# Patient Record
Sex: MALE | Race: WHITE
[De-identification: names, ages, dates, MRNs, and addresses within clinical notes are randomized per-mention and may not be internally consistent; named-entity substitution may affect disease eponyms.]

---

## 2017-01-20 ENCOUNTER — HOSPITAL ENCOUNTER (EMERGENCY)
Dept: HOSPITAL 17 - PHEFT | Age: 22
Discharge: HOME | End: 2017-01-20
Payer: MEDICAID

## 2017-01-20 VITALS
HEART RATE: 91 BPM | OXYGEN SATURATION: 97 % | TEMPERATURE: 97.9 F | RESPIRATION RATE: 16 BRPM | DIASTOLIC BLOOD PRESSURE: 76 MMHG | SYSTOLIC BLOOD PRESSURE: 128 MMHG

## 2017-01-20 VITALS — BODY MASS INDEX: 26.85 KG/M2 | WEIGHT: 191.8 LBS | HEIGHT: 71 IN

## 2017-01-20 DIAGNOSIS — Z20.2: ICD-10-CM

## 2017-01-20 DIAGNOSIS — F17.210: ICD-10-CM

## 2017-01-20 DIAGNOSIS — R30.0: Primary | ICD-10-CM

## 2017-01-20 LAB
CHLAMYDIA PCR: DETECTED
COLOR UR: YELLOW
COMMENT (UR): (no result)
CULTURE IF INDICATED: (no result)
GLUCOSE UR STRIP-MCNC: (no result) MG/DL
HGB UR QL STRIP: (no result)
KETONES UR STRIP-MCNC: (no result) MG/DL
LABORATORY COMMENT REPORT: (no result)
METHOD OF COLLECTION: (no result)
NEISSERIA PCR: NOT DETECTED
NITRITE UR QL STRIP: (no result)
SP GR UR STRIP: 1.02 (ref 1–1.03)
SQUAMOUS #/AREA URNS HPF: (no result) /HPF (ref 0–5)

## 2017-01-20 PROCEDURE — 96372 THER/PROPH/DIAG INJ SC/IM: CPT

## 2017-01-20 PROCEDURE — 81001 URINALYSIS AUTO W/SCOPE: CPT

## 2017-01-20 PROCEDURE — 99283 EMERGENCY DEPT VISIT LOW MDM: CPT

## 2017-01-20 PROCEDURE — 87086 URINE CULTURE/COLONY COUNT: CPT

## 2017-01-20 PROCEDURE — 87591 N.GONORRHOEAE DNA AMP PROB: CPT

## 2017-01-20 PROCEDURE — 87491 CHLMYD TRACH DNA AMP PROBE: CPT

## 2017-01-20 NOTE — PD
HPI


Chief Complaint:   Complaint


Time Seen by Provider:  15:15


Travel History


International Travel<30 days:  No


Contact w/Intl Traveler<30days:  No


Traveled to known affect area:  No





History of Present Illness


HPI


Patient's a 21-year-old male who comes into the emergency department for 

evaluation for exposure to chlamydia.  Patient states his girlfriend was 

diagnosed with Chlamydia one week ago during a routine prenatal exam.  Patient 

denies any discharge but reports occasional burning with urination.  He further 

denies any fevers, chills, abdominal pain or nausea, vomiting, back pain.





PFSH


Past Medical History


Medical History:  Denies Significant Hx


Tetanus Vaccination:  > 5 Years


Influenza Vaccination:  No





Social History


Alcohol Use:  No


Tobacco Use:  Yes (1 PPD)


Substance Use:  No





Allergies-Medications


(Allergen,Severity, Reaction):  


Coded Allergies:  


     No Known Allergies (Unverified , 1/20/17)


Reported Meds & Prescriptions





Reported Meds & Active Scripts


Active


No Active Prescriptions or Reported Medications    








Review of Systems


Except as stated in HPI:  all other systems reviewed are Neg


Genitourinary:  Positive: Dysuria





Physical Exam


Narrative


GENERAL: Well-nourished, well-developed patient.


SKIN: Warm and dry.


HEAD: Normocephalic.


EYES: No scleral icterus. No injection or drainage. 


NECK: Supple, trachea midline. No JVD or lymphadenopathy.


CARDIOVASCULAR: Regular rate and rhythm without murmurs, gallops, or rubs. 


RESPIRATORY: Breath sounds equal bilaterally. No accessory muscle use.


GASTROINTESTINAL: Abdomen soft, non-tender, nondistended. 


MUSCULOSKELETAL: No cyanosis, or edema. 


BACK: Nontender without obvious deformity. No CVA tenderness.





Data


Data


Last Documented VS





Vital Signs








  Date Time  Temp Pulse Resp B/P Pulse Ox O2 Delivery O2 Flow Rate FiO2


 


1/20/17 15:02 97.9 91 16 128/76 97   








Orders





 Urinalysis - C+S If Indicated (1/20/17 15:13)


Gc And Chlamydia Pcr (1/20/17 15:13)


Azithromycin (Zithromax) (1/20/17 15:15)


Ceftriaxone Inj (Rocephin Inj) (1/20/17 15:15)


Metronidazole (Flagyl) (1/20/17 15:15)


Ondansetron  Odt (Zofran  Odt) (1/20/17 15:15)


Lidocaine 1% Inj (50 Ml) (Xylocaine 1% I (1/20/17 15:15)


Urine Culture (1/20/17 15:15)





Labs





 Laboratory Tests








Test 1/20/17





 15:15


 


Urine Collection Type CLEAN CATCH 


 


Urine Color YELLOW 


 


Urine Turbidity CLEAR 


 


Urine pH 6.0 


 


Urine Specific Gravity 1.021 


 


Urine Protein NEG mg/dL


 


Urine Glucose (UA) NEG mg/dL


 


Urine Ketones NEG mg/dL


 


Urine Occult Blood NEG 


 


Urine Nitrite NEG 


 


Urine Bilirubin NEG 


 


Urine Leukocyte Esterase TRACE 


 


Urine WBC 9-14 /hpf


 


Urine Squamous Epithelial 0-5 /hpf





Cells 


 


Urine Amorphous Sediment FEW 


 


Microscopic Urinalysis Comment CULTURE





 INDICATED


 


Urine Collection Time 1515 











MDM


Medical Decision Making


Medical Screen Exam Complete:  Yes


Emergency Medical Condition:  Yes


Interpretation(s)





Vital Signs








  Date Time  Temp Pulse Resp B/P Pulse Ox O2 Delivery O2 Flow Rate FiO2


 


1/20/17 15:02 97.9 91 16 128/76 97   








Differential Diagnosis


Urinary tract infection versus dissection transmitted infection versus dysuria 

versus other


Narrative Course


Patient is a 21-year-old male who presented to emergency for evaluation after 

he was notified that his girlfriend has chlamydia.  Patient is in a monogamous 

relationship, she received this diagnosis during routine prenatal screening.  

Urinalysis revealed elevated white blood cells, culture is pending.  Patient 

was treated empirically for trichomoniasis, chlamydia, gonorrhea.  Patient was 

advised that he will be notified if his test results are positive.  Patient was 

advised to avoid sexual relations with his girlfriend until she completes the 

full course of treatment.  Patient verbalized understanding of these 

instructions.  He was further advised to avoid alcohol intake for at least 72 

hours.  Patient is stable for discharge.





Diagnosis





 Primary Impression:  


 Exposure to sexually transmitted disease (STD)


Referrals:  


Primary Care Physician





[facility] Dept.


Patient Instructions:  General Instructions, Sexually Transmitted Diseases (ED)





***Additional Instructions:


Follow-up with your primary doctor or at the [facility] Department 

for further screening for sexual transmitted diseases.


Do not take alcohol for at least 72 hours


Return to emergency department for any new or worsening symptoms


***Med/Other Pt SpecificInfo:  No Change to Meds


Scripts


No Active Prescriptions or Reported Meds


Disposition:  01 DISCHARGE HOME


Condition:  Stable








Yasmine Louis Jan 20, 2017 15:48

## 2017-06-20 ENCOUNTER — HOSPITAL ENCOUNTER (EMERGENCY)
Dept: HOSPITAL 17 - PHED | Age: 22
Discharge: HOME | End: 2017-06-20
Payer: MEDICAID

## 2017-06-20 VITALS — BODY MASS INDEX: 29.17 KG/M2 | WEIGHT: 208.34 LBS | HEIGHT: 71 IN

## 2017-06-20 VITALS
TEMPERATURE: 98.2 F | OXYGEN SATURATION: 98 % | SYSTOLIC BLOOD PRESSURE: 126 MMHG | HEART RATE: 78 BPM | DIASTOLIC BLOOD PRESSURE: 81 MMHG | RESPIRATION RATE: 16 BRPM

## 2017-06-20 DIAGNOSIS — R11.2: Primary | ICD-10-CM

## 2017-06-20 DIAGNOSIS — J45.909: ICD-10-CM

## 2017-06-20 DIAGNOSIS — R19.7: ICD-10-CM

## 2017-06-20 DIAGNOSIS — H81.10: ICD-10-CM

## 2017-06-20 DIAGNOSIS — F17.210: ICD-10-CM

## 2017-06-20 PROCEDURE — 99283 EMERGENCY DEPT VISIT LOW MDM: CPT

## 2017-06-20 NOTE — PD
HPI


Chief Complaint:  Dizziness


Time Seen by Provider:  11:31


Travel History


International Travel<30 days:  No


Contact w/Intl Traveler<30days:  No


Traveled to known affect area:  No





History of Present Illness


HPI


This patient complains of having nausea vomiting diarrhea.  He also complains 

of some room spinning dizziness.  Symptoms severity is mild.  No alleviating 

factors.  No head injury or fever or headache.





PFSH


Past Medical History


Hx Anticoagulant Therapy:  No


Diabetes:  No


Respiratory:  Yes (ASTHMA)





Social History


Alcohol Use:  No


Tobacco Use:  Yes (1 PPD)


Substance Use:  No





Allergies-Medications


(Allergen,Severity, Reaction):  


Coded Allergies:  


     No Known Allergies (Unverified , 6/20/17)


Reported Meds & Prescriptions





Reported Meds & Active Scripts


Active


No Active Prescriptions or Reported Medications    








Review of Systems


General / Constitutional:  No: Fever


HENT:  Positive: Vertigo,  No: Headaches


Gastrointestinal:  Positive: Nausea, Vomiting, Diarrhea





Physical Exam


Narrative


GENERAL: Well-nourished, well-developed patient in no apparent distress.


SKIN: Focused skin assessment reveals no rash and nodules. Skin is Warm and dry.


HEAD: Atraumatic. Normocephalic. 


EYES: Pupils equal and round. No scleral icterus. No injection or drainage. 


ENT: No nasal bleeding or discharge.  Mucous membranes pink and moist.


NECK: Trachea midline. No JVD. 


CARDIOVASCULAR: Regular rate and rhythm.  No murmur appreciated.


RESPIRATORY: No accessory muscle use. Clear to auscultation. Breath sounds 

equal bilaterally. 


GASTROINTESTINAL: Abdomen soft, non-tender, nondistended. Hepatic and splenic 

margins not palpable. 


MUSCULOSKELETAL: No obvious deformities. No clubbing.  No cyanosis.  No edema. 


NEUROLOGICAL: Awake and alert. No obvious cranial nerve deficits.  Motor 

grossly within normal limits. Normal speech.


PSYCHIATRIC: Appropriate mood and affect; insight and judgment normal.





Data


Data


Last Documented VS





Vital Signs








  Date Time  Temp Pulse Resp B/P Pulse Ox O2 Delivery O2 Flow Rate FiO2


 


6/20/17 11:24 98.2 78 16 126/81 98   











MDM


Medical Decision Making


Medical Screen Exam Complete:  Yes


Emergency Medical Condition:  Yes


Medical Record Reviewed:  Yes


Differential Diagnosis


Gastritis, colitis, positional vertigo


Narrative Course


I have reviewed the patient's electronic medical record.





Patient has normal vital signs and normal exam.


Presentation seems mild and benign





No objective findings.  I wrote some Zofran on prescription and discussed 

meclizine use which is over-the-counter


The patient was advised to follow up with their physician and return if they 

worsen.





Diagnosis





 Primary Impression:  


 Nausea vomiting and diarrhea


 Additional Impression:  


 Positional vertigo


 Qualified Code:  H81.10 - Positional vertigo, unspecified laterality





***Additional Instructions:


The patient was advised to follow up with their physician and return if they 

worsen.


***Med/Other Pt SpecificInfo:  Prescription(s) given


Scripts


Ondansetron (Zofran)4 Mg Tab4 Mg PO Q6HR PRN (NAUSEA OR VOMITING) #10 TAB  Ref 0


   Prov:Andre Patel MD         6/20/17


Disposition:  01 DISCHARGE HOME


Condition:  Stable








Andre Patel MD Jun 20, 2017 11:43

## 2018-01-07 ENCOUNTER — HOSPITAL ENCOUNTER (EMERGENCY)
Dept: HOSPITAL 17 - NEPD | Age: 23
Discharge: HOME | End: 2018-01-07
Payer: MEDICAID

## 2018-01-07 VITALS
DIASTOLIC BLOOD PRESSURE: 70 MMHG | RESPIRATION RATE: 16 BRPM | HEART RATE: 90 BPM | OXYGEN SATURATION: 99 % | SYSTOLIC BLOOD PRESSURE: 130 MMHG | TEMPERATURE: 97.7 F

## 2018-01-07 DIAGNOSIS — F17.200: ICD-10-CM

## 2018-01-07 DIAGNOSIS — J45.909: ICD-10-CM

## 2018-01-07 DIAGNOSIS — J02.9: Primary | ICD-10-CM

## 2018-01-07 PROCEDURE — 87880 STREP A ASSAY W/OPTIC: CPT

## 2018-01-07 PROCEDURE — 87081 CULTURE SCREEN ONLY: CPT

## 2018-01-07 PROCEDURE — 99283 EMERGENCY DEPT VISIT LOW MDM: CPT

## 2018-01-20 ENCOUNTER — HOSPITAL ENCOUNTER (EMERGENCY)
Dept: HOSPITAL 17 - NEPD | Age: 23
Discharge: HOME | End: 2018-01-20
Payer: MEDICAID

## 2018-01-20 VITALS
DIASTOLIC BLOOD PRESSURE: 93 MMHG | HEART RATE: 98 BPM | TEMPERATURE: 98.8 F | SYSTOLIC BLOOD PRESSURE: 166 MMHG | OXYGEN SATURATION: 96 % | RESPIRATION RATE: 14 BRPM

## 2018-01-20 DIAGNOSIS — F17.200: ICD-10-CM

## 2018-01-20 DIAGNOSIS — J02.9: Primary | ICD-10-CM

## 2018-01-20 DIAGNOSIS — J45.909: ICD-10-CM

## 2018-01-20 PROCEDURE — 99283 EMERGENCY DEPT VISIT LOW MDM: CPT

## 2018-01-20 NOTE — PD
HPI


Chief Complaint:  ENT Complaint


Time Seen by Provider:  18:40


Travel History


International Travel<30 days:  No


Contact w/Intl Traveler<30days:  No


Traveled to known affect area:  No





History of Present Illness


HPI


Patient 22-year-old male presents to the emergency department with sore throat 

2 weeks, no cough, states it has become painful to swallow, no fevers, no chest 

pain or shortness of breath no abdominal pain no nausea or vomiting.  States it 

feels like his throat starting to swell a little bit too.  Here 2 weeks ago 

negative rapid strep test at that time, states symptoms are moderate, gradually 

worsening duration the past 2 weeks, associated sinus symptoms as above.





PFSH


Past Medical History


Hx Anticoagulant Therapy:  No


Asthma:  Yes


Diabetes:  No


Diminished Hearing:  No


Respiratory:  Yes (ASTHMA)


Immunizations Current:  Yes


Tetanus Vaccination:  Unknown


Influenza Vaccination:  No





Social History


Alcohol Use:  No


Tobacco Use:  Yes (1 PPD)


Substance Use:  No





Allergies-Medications


(Allergen,Severity, Reaction):  


Coded Allergies:  


     No Known Allergies (Unverified  Adverse Reaction, Unknown, 1/20/18)


Reported Meds & Prescriptions





Reported Meds & Active Scripts


Active


Azithromycin 250 Mg Tab 250 Mg PO AS DIRECTED


     Take 2 tabs (500 mg) on day 1 then 1 tab daily x 4 days.








Review of Systems


Except as stated in HPI:  all other systems reviewed are Neg





Physical Exam


Narrative


GENERAL: Well-nourished, well-developed patient.


SKIN: Focused skin assessment warm/dry.  No rash no wound


HEAD: Normocephalic.  Atraumatic


EYES: No scleral icterus. No injection or drainage. 


ENT: Oropharynx erythematous, tonsils are 3+ but no purulent drainage, swallows 

easily protecting his airway well, airways patent.  No uvular nor soft palate 

swelling.  TMs clear bilaterally.


NECK: Supple, trachea midline. No JVD or lymphadenopathy.


CARDIOVASCULAR: Regular rate and rhythm without murmurs, gallops, or rubs. 


RESPIRATORY: Breath sounds equal bilaterally. No accessory muscle use.


GASTROINTESTINAL: Abdomen soft, non-tender, nondistended. 


MUSCULOSKELETAL: No cyanosis, or edema. 


BACK: Nontender without obvious deformity. No CVA tenderness.





Data


Data


Last Documented VS





Vital Signs








  Date Time  Temp Pulse Resp B/P (MAP) Pulse Ox O2 Delivery O2 Flow Rate FiO2


 


1/20/18 19:15        


 


1/20/18 17:52 98.8 98 14  96   








Orders





 Orders


Azithromycin (Zithromax) (1/20/18 19:00)


Ed Discharge Order (1/20/18 18:58)








MDM


Medical Decision Making


Medical Screen Exam Complete:  Yes


Emergency Medical Condition:  Yes


Differential Diagnosis


Streptococcal pharyngitis, URI, virally pharyngitis.


Narrative Course


Patient room to the emergency department, he appears well in no obvious distress

, protecting his airway, given the duration of symptoms will cover for atypical 

causes with azithromycin.  Stable for discharge, follow-up with primary care 

physician.  Discussed smoking cessation in smoking's many adverse health 

effects.





Diagnosis





 Primary Impression:  


 Pharyngitis


 Qualified Codes:  J02.9 - Acute pharyngitis, unspecified


Departure Forms:  Tests/Procedures, Work Release   Enter return to work date:  

Jan 21, 2018


***Med/Other Pt SpecificInfo:  Prescription(s) given


Scripts


Azithromycin (Azithromycin) 250 Mg Tab


250 MG PO AS DIRECTED for Infection, #6 TAB 0 Refills


   Take 2 tabs (500 mg) on day 1 then 1 tab daily x 4 days.


   Prov: Regino Colbert MD         1/20/18


Disposition:  01 DISCHARGE HOME


Condition:  Stable











Regino Colbert MD Jan 20, 2018 18:58